# Patient Record
Sex: FEMALE | Race: ASIAN | URBAN - METROPOLITAN AREA
[De-identification: names, ages, dates, MRNs, and addresses within clinical notes are randomized per-mention and may not be internally consistent; named-entity substitution may affect disease eponyms.]

---

## 2020-08-19 NOTE — PROGRESS NOTES
Pt here for pe/pap.   LMP:about 3 years ago   Last pap:a couple of years ago negative   Last mammogram;Last month ,  Negative   Birth control:None

## 2020-08-19 NOTE — PROGRESS NOTES
HPI:   Zoie Ellison is a 40year old  who presents for an annual gynecological exam.   Menses: No LMP recorded. Patient is postmenopausal. Cycle length: o days Flow: none    last menstrual period was more than 2 years ago.   2 vaginal deliveries and rhythm  LUNGS: Clear to auscultation bilaterally, good air entry throughout  ABD: Soft, nontender and not distended, no masses, no hernia  EXTREMITIES: No edema  EXTERNAL GENITALIA: Normal appearance for age, no lesions, normal hair distribution for age  U ordered by primary MD. Larry Gu 1 year or PRN.   Diagnoses and all orders for this visit:    Encounter for annual routine gynecological examination    Screening for malignant neoplasm of the cervix  -     THINPREP PAP SMEAR B; Future    Screening for human mallika

## 2021-08-23 NOTE — PROGRESS NOTES
Pt here for pe/pap.   LMP:More than 2 years ago   Last pap:8/19/20 negative   Last mammogram;6/28/21 negative   Birth control:None

## 2021-09-29 NOTE — TELEPHONE ENCOUNTER
Spoke with patient, advised that Dr. Ervin Danielle stated she should stop the patch now and then in 2 weeks have [facility],  and Estridol levels drawn. Then make an appointment to see her to discuss options, such as oral HRT. Patient has appointment booked 10/25.  Myriam

## 2021-09-29 NOTE — TELEPHONE ENCOUNTER
Patient states that she is having some vaginal spotting after starting the Combi patch. Dark red. Started a few days ago. She has been on the patch for a month. Slight cramping. Patient has not had a period in 2 years.  Has not noticed any improvement with

## 2021-09-29 NOTE — TELEPHONE ENCOUNTER
Pt stated she has question in regards to the medication Estradiol-Norethindrone Acet. Pt had difficulty putting question together and requested Dr. Aubree Medina call her.

## 2021-10-25 NOTE — PROGRESS NOTES
HPI:   Lexy Chappell is a 39year old female presents for consultation regarding hormone replacement. She has been experiencing significant hot flashes and night sweats and was placed on CombiPatch twice weekly which she took for approximately 1 month. of breath, wheezing or cough   CARDIOVASCULAR: denies chest pain or SANDRA; no palpitations   GI: denies nausea, vomiting, constipation, diarrhea; no rectal bleeding; no heartburn  GYNE/: no dysuria, urgency or frequency; no abnormal vaginal discharge; no d

## 2024-01-26 ENCOUNTER — HOSPITAL ENCOUNTER (OUTPATIENT)
Dept: MAMMOGRAPHY | Age: 49
Discharge: HOME | End: 2024-01-26
Attending: INTERNAL MEDICINE
Payer: COMMERCIAL

## 2024-01-26 DIAGNOSIS — Z12.31 ENCOUNTER FOR SCREENING MAMMOGRAM FOR MALIGNANT NEOPLASM OF BREAST: ICD-10-CM

## 2024-01-26 PROCEDURE — 77067 SCR MAMMO BI INCL CAD: CPT | Performed by: INTERNAL MEDICINE

## 2024-01-26 PROCEDURE — 77063 BREAST TOMOSYNTHESIS BI: CPT | Performed by: INTERNAL MEDICINE

## 2024-02-08 ENCOUNTER — HOSPITAL ENCOUNTER (OUTPATIENT)
Dept: MAMMOGRAPHY | Facility: HOSPITAL | Age: 49
Discharge: HOME | End: 2024-02-08
Attending: INTERNAL MEDICINE
Payer: COMMERCIAL

## 2024-02-08 DIAGNOSIS — R92.2 INCONCLUSIVE MAMMOGRAM: ICD-10-CM

## 2024-02-08 PROCEDURE — 76642 ULTRASOUND BREAST LIMITED: CPT | Performed by: INTERNAL MEDICINE

## 2024-06-19 ENCOUNTER — HOSPITAL ENCOUNTER (OUTPATIENT)
Dept: ULTRASOUND IMAGING | Age: 49
Discharge: HOME | End: 2024-06-19
Attending: INTERNAL MEDICINE

## 2024-06-19 DIAGNOSIS — K76.0 FATTY LIVER: ICD-10-CM

## 2024-06-19 PROCEDURE — 76700 US EXAM ABDOM COMPLETE: CPT | Performed by: INTERNAL MEDICINE

## (undated) NOTE — MR AVS SNAPSHOT
After Visit Summary   8/19/2020    Zoie Ellison    MRN: [id number]           Visit Information     Date & Time  8/19/2020  1:30 PM Provider  Maricarmen Martinez MD Department  [place] [address], [place] Dept.  Phone  [phone]      Your Vit to securely access your online medical record. [employer] allows you to send messages to your doctor, view test results, renew prescriptions and request appointments. How Do I Sign Up? 1. In your Internet browser, go to [email or web address]. c Make half your plate fruits and vegetables Highly refined, white starches including white bread, rice and pasta   Eat plenty of protein, keep the fat content low Sugars:  sodas and sports drinks, candies and desserts   Eat plenty of low-fat dairy products JESUS using your mobile device or computer   using [address] with a [facility] Physician or JESUS online. The physician will respond and provide   a treatment plan within a few hours.  ONLINE VISIT  Primary Care Providers  Treatment for mild il

## (undated) NOTE — MR AVS SNAPSHOT
After Visit Summary   8/23/2021    Sayda Dacosta    MRN: [id number]           Visit Information     Date & Time  8/23/2021 12:00 PM Provider  Clay Dutta MD Department  [place] [address], [place] Dept.  Phone  [phone]      Your Vit deliver the best patient experience and are looking for ways to make improvements. Your feedback will help us do so. For more information on [employer], please visit [email or web address]                   DO YOU KNOW WHERE TO GO? your insurance coverage  For more information about hours, locations or appointment options available at [facility],   visit [email or web address] or call [phone] [email or web address]. [phone])